# Patient Record
Sex: MALE | Race: OTHER | NOT HISPANIC OR LATINO | URBAN - METROPOLITAN AREA
[De-identification: names, ages, dates, MRNs, and addresses within clinical notes are randomized per-mention and may not be internally consistent; named-entity substitution may affect disease eponyms.]

---

## 2019-06-25 ENCOUNTER — EMERGENCY (EMERGENCY)
Facility: HOSPITAL | Age: 74
LOS: 1 days | Discharge: HOME | End: 2019-06-25
Attending: EMERGENCY MEDICINE | Admitting: EMERGENCY MEDICINE
Payer: MEDICAID

## 2019-06-25 VITALS
DIASTOLIC BLOOD PRESSURE: 84 MMHG | TEMPERATURE: 99 F | HEART RATE: 89 BPM | RESPIRATION RATE: 18 BRPM | SYSTOLIC BLOOD PRESSURE: 129 MMHG | OXYGEN SATURATION: 96 %

## 2019-06-25 LAB
ALBUMIN SERPL ELPH-MCNC: 3.9 G/DL — SIGNIFICANT CHANGE UP (ref 3.3–5)
ALP SERPL-CCNC: 60 U/L — SIGNIFICANT CHANGE UP (ref 40–120)
ALT FLD-CCNC: 16 U/L — SIGNIFICANT CHANGE UP (ref 4–41)
ANION GAP SERPL CALC-SCNC: 10 MMO/L — SIGNIFICANT CHANGE UP (ref 7–14)
APPEARANCE UR: CLEAR — SIGNIFICANT CHANGE UP
AST SERPL-CCNC: 15 U/L — SIGNIFICANT CHANGE UP (ref 4–40)
BACTERIA # UR AUTO: HIGH
BASOPHILS # BLD AUTO: 0.03 K/UL — SIGNIFICANT CHANGE UP (ref 0–0.2)
BASOPHILS NFR BLD AUTO: 0.3 % — SIGNIFICANT CHANGE UP (ref 0–2)
BILIRUB SERPL-MCNC: 0.4 MG/DL — SIGNIFICANT CHANGE UP (ref 0.2–1.2)
BILIRUB UR-MCNC: NEGATIVE — SIGNIFICANT CHANGE UP
BLOOD UR QL VISUAL: SIGNIFICANT CHANGE UP
BUN SERPL-MCNC: 14 MG/DL — SIGNIFICANT CHANGE UP (ref 7–23)
CALCIUM SERPL-MCNC: 9.8 MG/DL — SIGNIFICANT CHANGE UP (ref 8.4–10.5)
CHLORIDE SERPL-SCNC: 104 MMOL/L — SIGNIFICANT CHANGE UP (ref 98–107)
CO2 SERPL-SCNC: 28 MMOL/L — SIGNIFICANT CHANGE UP (ref 22–31)
COLOR SPEC: YELLOW — SIGNIFICANT CHANGE UP
CREAT SERPL-MCNC: 1.24 MG/DL — SIGNIFICANT CHANGE UP (ref 0.5–1.3)
EOSINOPHIL # BLD AUTO: 0.15 K/UL — SIGNIFICANT CHANGE UP (ref 0–0.5)
EOSINOPHIL NFR BLD AUTO: 1.7 % — SIGNIFICANT CHANGE UP (ref 0–6)
GLUCOSE SERPL-MCNC: 84 MG/DL — SIGNIFICANT CHANGE UP (ref 70–99)
GLUCOSE UR-MCNC: NEGATIVE — SIGNIFICANT CHANGE UP
HBA1C BLD-MCNC: 6.1 % — HIGH (ref 4–5.6)
HCT VFR BLD CALC: 42.6 % — SIGNIFICANT CHANGE UP (ref 39–50)
HGB BLD-MCNC: 13.2 G/DL — SIGNIFICANT CHANGE UP (ref 13–17)
HYALINE CASTS # UR AUTO: SIGNIFICANT CHANGE UP
IMM GRANULOCYTES NFR BLD AUTO: 1.4 % — SIGNIFICANT CHANGE UP (ref 0–1.5)
KETONES UR-MCNC: NEGATIVE — SIGNIFICANT CHANGE UP
LEUKOCYTE ESTERASE UR-ACNC: SIGNIFICANT CHANGE UP
LYMPHOCYTES # BLD AUTO: 2.85 K/UL — SIGNIFICANT CHANGE UP (ref 1–3.3)
LYMPHOCYTES # BLD AUTO: 33.1 % — SIGNIFICANT CHANGE UP (ref 13–44)
MCHC RBC-ENTMCNC: 26.7 PG — LOW (ref 27–34)
MCHC RBC-ENTMCNC: 31 % — LOW (ref 32–36)
MCV RBC AUTO: 86.2 FL — SIGNIFICANT CHANGE UP (ref 80–100)
MONOCYTES # BLD AUTO: 0.68 K/UL — SIGNIFICANT CHANGE UP (ref 0–0.9)
MONOCYTES NFR BLD AUTO: 7.9 % — SIGNIFICANT CHANGE UP (ref 2–14)
NEUTROPHILS # BLD AUTO: 4.79 K/UL — SIGNIFICANT CHANGE UP (ref 1.8–7.4)
NEUTROPHILS NFR BLD AUTO: 55.6 % — SIGNIFICANT CHANGE UP (ref 43–77)
NITRITE UR-MCNC: POSITIVE — HIGH
NRBC # FLD: 0 K/UL — SIGNIFICANT CHANGE UP (ref 0–0)
PH UR: 6 — SIGNIFICANT CHANGE UP (ref 5–8)
PLATELET # BLD AUTO: 282 K/UL — SIGNIFICANT CHANGE UP (ref 150–400)
PMV BLD: 10.1 FL — SIGNIFICANT CHANGE UP (ref 7–13)
POTASSIUM SERPL-MCNC: 4.3 MMOL/L — SIGNIFICANT CHANGE UP (ref 3.5–5.3)
POTASSIUM SERPL-SCNC: 4.3 MMOL/L — SIGNIFICANT CHANGE UP (ref 3.5–5.3)
PROT SERPL-MCNC: 6.8 G/DL — SIGNIFICANT CHANGE UP (ref 6–8.3)
PROT UR-MCNC: 30 — SIGNIFICANT CHANGE UP
RBC # BLD: 4.94 M/UL — SIGNIFICANT CHANGE UP (ref 4.2–5.8)
RBC # FLD: 15.1 % — HIGH (ref 10.3–14.5)
RBC CASTS # UR COMP ASSIST: SIGNIFICANT CHANGE UP (ref 0–?)
SODIUM SERPL-SCNC: 142 MMOL/L — SIGNIFICANT CHANGE UP (ref 135–145)
SP GR SPEC: 1.02 — SIGNIFICANT CHANGE UP (ref 1–1.04)
SQUAMOUS # UR AUTO: SIGNIFICANT CHANGE UP
UROBILINOGEN FLD QL: NORMAL — SIGNIFICANT CHANGE UP
WBC # BLD: 8.62 K/UL — SIGNIFICANT CHANGE UP (ref 3.8–10.5)
WBC # FLD AUTO: 8.62 K/UL — SIGNIFICANT CHANGE UP (ref 3.8–10.5)
WBC UR QL: >50 — HIGH (ref 0–?)

## 2019-06-25 PROCEDURE — 72131 CT LUMBAR SPINE W/O DYE: CPT | Mod: 26

## 2019-06-25 PROCEDURE — 70450 CT HEAD/BRAIN W/O DYE: CPT | Mod: 26

## 2019-06-25 PROCEDURE — 99220: CPT

## 2019-06-25 PROCEDURE — 93971 EXTREMITY STUDY: CPT | Mod: 26,LT

## 2019-06-25 RX ORDER — CEFTRIAXONE 500 MG/1
1000 INJECTION, POWDER, FOR SOLUTION INTRAMUSCULAR; INTRAVENOUS ONCE
Refills: 0 | Status: COMPLETED | OUTPATIENT
Start: 2019-06-25 | End: 2019-06-25

## 2019-06-25 RX ORDER — LIDOCAINE 4 G/100G
2 CREAM TOPICAL EVERY 24 HOURS
Refills: 0 | Status: DISCONTINUED | OUTPATIENT
Start: 2019-06-25 | End: 2019-06-29

## 2019-06-25 RX ORDER — OXYCODONE AND ACETAMINOPHEN 5; 325 MG/1; MG/1
1 TABLET ORAL ONCE
Refills: 0 | Status: DISCONTINUED | OUTPATIENT
Start: 2019-06-25 | End: 2019-06-25

## 2019-06-25 RX ORDER — OXYCODONE AND ACETAMINOPHEN 5; 325 MG/1; MG/1
1 TABLET ORAL EVERY 6 HOURS
Refills: 0 | Status: DISCONTINUED | OUTPATIENT
Start: 2019-06-25 | End: 2019-06-25

## 2019-06-25 RX ORDER — LIDOCAINE 4 G/100G
1 CREAM TOPICAL ONCE
Refills: 0 | Status: COMPLETED | OUTPATIENT
Start: 2019-06-25 | End: 2019-06-25

## 2019-06-25 RX ORDER — CEFTRIAXONE 500 MG/1
1000 INJECTION, POWDER, FOR SOLUTION INTRAMUSCULAR; INTRAVENOUS EVERY 24 HOURS
Refills: 0 | Status: DISCONTINUED | OUTPATIENT
Start: 2019-06-26 | End: 2019-06-29

## 2019-06-25 RX ORDER — KETOROLAC TROMETHAMINE 30 MG/ML
15 SYRINGE (ML) INJECTION EVERY 6 HOURS
Refills: 0 | Status: DISCONTINUED | OUTPATIENT
Start: 2019-06-25 | End: 2019-06-25

## 2019-06-25 RX ADMIN — CEFTRIAXONE 100 MILLIGRAM(S): 500 INJECTION, POWDER, FOR SOLUTION INTRAMUSCULAR; INTRAVENOUS at 19:51

## 2019-06-25 RX ADMIN — LIDOCAINE 1 PATCH: 4 CREAM TOPICAL at 17:36

## 2019-06-25 RX ADMIN — OXYCODONE AND ACETAMINOPHEN 1 TABLET(S): 5; 325 TABLET ORAL at 17:36

## 2019-06-25 NOTE — ED PROVIDER NOTE - ATTENDING CONTRIBUTION TO CARE
Dr. Batres:  I performed a face to face bedside interview with patient regarding history of present illness, review of symptoms and past medical history. I completed an independent physical exam.  I have discussed patient's plan of care with PA.   I agree with note as stated above, having amended the EMR as needed to reflect my findings.   This includes HISTORY OF PRESENT ILLNESS, HIV, PAST MEDICAL/SURGICAL/FAMILY/SOCIAL HISTORY, ALLERGIES AND HOME MEDICATIONS, REVIEW OF SYSTEMS, PHYSICAL EXAM, and any PROGRESS NOTES during the time I functioned as the attending physician for this patient.    74M denies known pmh presents with low back pain radiating to left lower extremity.  Endorses new incontinence x 3 days, both bladder and bowel.  Also endorses LLE numbness and ankle swelling.  Denies trauma/fall, fever/chills, cp, sob, n/v/d.    Exam:  - nad  - rrr  - ctab   -abd soft ntnd  - no midline TTP    A/P  - concern for spinal cord compression  - cbc, cmp, ua, urine culture  - MRI lumbar spine  - pain control

## 2019-06-25 NOTE — ED PROVIDER NOTE - OBJECTIVE STATEMENT
74 year old male with no known PMH (has not seen a doctor in years) presents to the ED complaining of low back pain for 3 days, severity 9/10, radiating to the left lower extremity, worse with walking, no relieving factors. Pt reports associated left lower extremity numbness with bowel/urinary incontinence to the point where he started wearing diapers for the first time since Sunday because he "wet the bed" multiple times. Pt denies saddle paresthesia, weakness, hx of malignancy, fall or trauma. Pt denies any other complaints.

## 2019-06-25 NOTE — ED CDU PROVIDER INITIAL DAY NOTE - MEDICAL DECISION MAKING DETAILS
1. Back pain:  Analgesia per orders, supportive care, MRI lumbar spine, general observation care / monitoring.   2. UTI: Antibiotics per orders, supportive care, UCX sent to lab / currently testing (f/u per ED Admin process), general observation care / monitoring.

## 2019-06-25 NOTE — ED CDU PROVIDER INITIAL DAY NOTE - OBJECTIVE STATEMENT
74 year old male with no known PMH (has not seen a doctor in years) presents to the ED complaining of low back pain for 3 days, severity 9/10, radiating to the left lower extremity, worse with walking, no relieving factors. Pt reports associated left lower extremity numbness with bowel/urinary incontinence to the point where he started wearing diapers for the first time since Sunday because he "wet the bed" multiple times. Pt denies saddle paresthesia, weakness, hx of malignancy, fall or trauma. Pt denies any other complaints.    CDU MARY Sauceda Note-----  73 yo male, no known PMH, no hx/o regular outpatient medical follow up; pt presented to the ED for back pain, LLE numbness, and incontinence as per above.  Patient was evaluated in the ED; CT head negative for acute pathology; CT lumbar spine showed: "There is no evidence for acute fracture or subluxation in the lumbar spine.  Disc bulges, facet degenerative changes, and ligamentum flavum hypertrophy result in multilevel spinal canal stenosis and neural foraminal narrowing, the overall degree of which is not well demonstrated on this study. The spinal canal stenosis does however appear at least moderate at the L3-L4 level.  Nonspecific small rounded lucencies involve the left iliac bone. Hemangiomas or other benign lesions, underlying metastatic disease, myeloma, or other lesion can't be excluded.".  US LLE negative for DVT; UA showed large leukocytes and positive nitrite; Urine Culture sent to lab and currently is testing.  Labs otherwise unremarkable.  Patient dispo'd to CDU for continued care plan:    1. Back pain:  Analgesia per orders, supportive care, MRI lumbar spine, general observation care / monitoring.   2. UTI: Antibiotics per orders, supportive care, UCX sent to lab / currently testing (f/u per ED Admin process), general observation care / monitoring.

## 2019-06-25 NOTE — ED PROVIDER NOTE - CLINICAL SUMMARY MEDICAL DECISION MAKING FREE TEXT BOX
74 year old male with no known PMH presenting with lumbar radiculopathy and associated urinary/bowel incontinence, concerning for compression cord syndrome. will do Lumbar spine CT, labs and UA, pain control, likely admit for a lumbar MRI.

## 2019-06-25 NOTE — ED CDU PROVIDER INITIAL DAY NOTE - ATTENDING CONTRIBUTION TO CARE
Dr. Batres:  I performed a face to face bedside interview with patient regarding history of present illness, review of symptoms and past medical history. I completed an independent physical exam.  I have discussed patient's plan of care with PA.   I agree with note as stated above, having amended the EMR as needed to reflect my findings.   This includes HISTORY OF PRESENT ILLNESS, HIV, PAST MEDICAL/SURGICAL/FAMILY/SOCIAL HISTORY, ALLERGIES AND HOME MEDICATIONS, REVIEW OF SYSTEMS, PHYSICAL EXAM, and any PROGRESS NOTES during the time I functioned as the attending physician for this patient.    74M denies known pmh presented to ED with low back pain radiating to left lower extremity.  Endorses new incontinence x 3 days, both bladder and bowel.  Also endorses LLE numbness and ankle swelling.  Denies trauma/fall, fever/chills, cp, sob, n/v/d.    Exam:  - nad  - rrr  - ctab   -abd soft ntnd  - no midline TTP    A/P  - concern for spinal cord compression  - MRI lumbar spine  - pain control.

## 2019-06-26 VITALS
SYSTOLIC BLOOD PRESSURE: 140 MMHG | RESPIRATION RATE: 18 BRPM | TEMPERATURE: 98 F | HEART RATE: 93 BPM | DIASTOLIC BLOOD PRESSURE: 97 MMHG | OXYGEN SATURATION: 99 %

## 2019-06-26 PROCEDURE — 72158 MRI LUMBAR SPINE W/O & W/DYE: CPT | Mod: 26

## 2019-06-26 PROCEDURE — 72170 X-RAY EXAM OF PELVIS: CPT | Mod: 26

## 2019-06-26 PROCEDURE — 99217: CPT

## 2019-06-26 RX ORDER — CEPHALEXIN 500 MG
1 CAPSULE ORAL
Qty: 14 | Refills: 0
Start: 2019-06-26 | End: 2019-07-02

## 2019-06-26 RX ADMIN — LIDOCAINE 2 PATCH: 4 CREAM TOPICAL at 11:56

## 2019-06-26 RX ADMIN — LIDOCAINE 1 PATCH: 4 CREAM TOPICAL at 00:35

## 2019-06-26 NOTE — PROVIDER CONTACT NOTE (OTHER) - ACTION/TREATMENT ORDERED:
Prescription and list of stores which provide RW given to Pt's son, he will pick him up home at 5 pm. PA and RN was informed. Prescription and list of stores which provide RW given to Pt's son, he provides home transportation at 5 pm today. PA and RN was informed.

## 2019-06-26 NOTE — ED CDU PROVIDER SUBSEQUENT DAY NOTE - MUSCULOSKELETAL, MLM
Spine appears normal, range of motion is not limited, no muscle or joint tenderness. 5/5 strength b/l. No saddle anesthesia.

## 2019-06-26 NOTE — ED CDU PROVIDER DISPOSITION NOTE - NSFOLLOWUPINSTRUCTIONS_ED_ALL_ED_FT
Follow up with Orthopedics Dr. Long in 1 week [phone]  Bear weight as tolerated  Use walker for assistance    Follow up with your primary care provider within 48 hours  Take copy of results with you  Take Keflex 500mg twice a day for 1 week for urinary tract infection    Follow up with Spine Ortho for lower back pain. Referral list given    Return to ER for any new or worsening symptoms.

## 2019-06-26 NOTE — PROVIDER CONTACT NOTE (OTHER) - ASSESSMENT
Pt is a 74-year-old male, who found with UTI and possible lytic lesions vs hemangiomas on MRI lumbar spine. Pt was referred to ED CM for potential PT services (out-pt vs. STR). [place] is [place] and has no insurance coverage, spoke to Mr. Tam (medicaid supervisor) at [phone], Pt is eligible for emergency medicaid and has coverage for inpatient expenses, however for any out-pt services Pt has to pay from his packet. Pt is waiting for PT evaluation, will follow the d/c plan. Pt is a 74-year-old male, who found with UTI and possible lytic lesions vs hemangiomas on MRI lumbar spine. Pt was referred to ED CM for potential PT services (out-pt vs. STR). [place] and has no insurance coverage, spoke to Mr. Tam (medicaid supervisor) at [phone], Pt is eligible for emergency medicaid and has coverage for inpatient expenses, however for any out-pt services Pt has to pay from his packet.

## 2019-06-26 NOTE — ED CDU PROVIDER SUBSEQUENT DAY NOTE - MEDICAL DECISION MAKING DETAILS
73 y/o male with no pmhx (no pcp, does not follow with doctors) presents to ED c/o few days of lower back pain radiating down left leg with stiffness and difficulty ambulating with cane. Pt also noted new urinary and bowel incontinence for past few days. CT head negative. Pt found with UTI and possible lytic lesions vs hemangiomas on MRI lumbar spine. No spinal cord compression. discussed with neurosurgery and no surgical intervention, suggesting ortho consult. ortho paged will see pt. pending PT eval.

## 2019-06-26 NOTE — PROVIDER CONTACT NOTE (OTHER) - RECOMMENDATIONS
PT evaluated the patient and Rolling Walker was recommended. Spoke to Pt's son (Alden) at [phone] regarding d/c plan, he is in agreement and he will pay for his RW.

## 2019-06-26 NOTE — ED CDU PROVIDER SUBSEQUENT DAY NOTE - HISTORY
73 y/o male with no pmhx (no pcp, does not follow with doctors) presents to ED c/o few days of lower back pain radiating down left leg with stiffness and difficulty ambulating. Pt also noted new urinary and bowel incontinence for past few days and started wearing diapers. Pt states his pain is well controlled however still having difficulty walking with his cane. Pt found with UTI and possible lytic lesions vs hemangiomas on MRI lumbar spine. No spinal cord compression. No fever, chills, cp, sob, abd pain, flank pain, n/v, weakness, numbness, hematuria, headache, dizziness, saddle anesthesia.

## 2019-06-26 NOTE — ED CDU PROVIDER SUBSEQUENT DAY NOTE - PROGRESS NOTE DETAILS
as per ortho can f.u outpt . pt amenable for dc. given prescription for walker as per PT and case management. son will be picking pt up. discussed need for close outpt follow up. pt self pay and prefers to be worked up outpt

## 2019-06-26 NOTE — ED CDU PROVIDER SUBSEQUENT DAY NOTE - ATTENDING CONTRIBUTION TO CARE
Patient presents with lower back pain rad down left leg and urinary frequency/incont, bowel incont, had w/u in ed with ct and sent to cdu for mri. Patient states pain is improved though still having difficulty walking. Denies numbness/focal weakness, no saddle anesthesia. MR results reviewed with patient. on abx for uti.   exam  A & O x 3, NAD, HEENT WNL and no facial asymmetry; lungs CTAB, heart with reg rhythm without murmur; abdomen soft NTND; back with no spinal tenderness or stepoff, extremities with no edema and from; neuro exam 5/5 strength in all extremities, sensation intact, gait steady. On ambulation trial with cane, patient feels he cant take more then 1 step which is unusual for him. Plan in cdu to discuss findings with spine/ortho, pt eval, abx for uti, monitor, reass.

## 2019-06-26 NOTE — CONSULT NOTE ADULT - SUBJECTIVE AND OBJECTIVE BOX
74y Male presents with low back pain radiating down the entire left leg with reported weakness since Sunday. Patient also endorses bladder/bowel incontinence since Sunday. Neurosurgery recommended MRI L spine which was negative for compression so recommended no acute intervention to the ED. CT L spine obtained by ED revealed several lesions in L iliac wing. Ortho consulted for these lesions. Patient denies recent trauma, fever/chills, genital/perianal numbness, or any medical history. Of note, patient is visiting his son for several weeks and lives in [place].         PAST MEDICAL & SURGICAL HISTORY:  No pertinent past medical history  No significant past surgical history    MEDICATIONS  (STANDING):  cefTRIAXone   IVPB 1000 milliGRAM(s) IV Intermittent every 24 hours  lidocaine   Patch 2 Patch Transdermal every 24 hours    MEDICATIONS  (PRN):  ketorolac   Injectable 15 milliGRAM(s) IV Push every 6 hours PRN mild to moderate pain  oxyCODONE    5 mG/acetaminophen 325 mG 1 Tablet(s) Oral every 6 hours PRN Severe Pain (7 - 10)    No Known Allergies      Physical Exam  T(C): 36.6 (06-26-19 @ 10:06), Max: 37 (06-25-19 @ 16:15)  HR: 64 (06-26-19 @ 10:06) (64 - 95)  BP: 144/97 (06-26-19 @ 10:06) (126/88 - 144/97)  RR: 18 (06-26-19 @ 10:06) (16 - 18)  SpO2: 97% (06-26-19 @ 10:06) (96% - 98%)  Wt(kg): --    PE  Gen: NAD, alert and oriented  Resp: Unlabored breathing  B/L LEs  Skin intact  L2-S1 5/5  L2-S1 SILT  Dp+  negative SLR  negative clonus  negative babinski  2+ reflexes  negative log roll  negative heel strike    some pain with palpation of Left iliac crest              Imaging  X-ray: no visible lesion  CT pelvis: several left iliac wing lesions    A/P: 74yoM w/ no PMHx presents with LBP radiating down left leg and new lesions in left iliac wing found on CT pelvis    - No acute orthopedic intervention  - Spine recs per neurosurgery  - pain control  - WBAT  - FU with Dr. Long in 1 week. [phone]   [id number]

## 2019-06-26 NOTE — ED CDU PROVIDER DISPOSITION NOTE - ATTENDING CONTRIBUTION TO CARE
73 y/o male with no pmhx (no pcp, does not follow with doctors) presents to ED c/o few days of lower back pain radiating down left leg with stiffness and difficulty ambulating. Pt also noted new urinary and bowel incontinence for past few days and started wearing diapers. Pt states his pain is well controlled however still having difficulty walking with his cane. Patient had labs, ua, ct and mri l-spine. Pt found with UTI and possible lytic lesions vs hemangiomas on MRI on iliac. No spinal cord compression. No fever, chills, cp, sob, abd pain, flank pain, n/v, weakness, numbness, hematuria, headache, dizziness, saddle anesthesia. Patient was evaluated by orthopedics-determined no acute intervention needed. Evaluated by pt who recommended ok for dc with walker. Patient trialed walker in cdu with success. All results d/w him and copies given. Given abx for uti. Return precautions discussed. Instructed on f/u with orthopedics/spine and pcp asap. DC home with his son.

## 2019-06-27 LAB — SPECIMEN SOURCE: SIGNIFICANT CHANGE UP

## 2019-06-29 LAB
-  AMIKACIN: SIGNIFICANT CHANGE UP
-  AMPICILLIN/SULBACTAM: SIGNIFICANT CHANGE UP
-  AMPICILLIN: SIGNIFICANT CHANGE UP
-  AZTREONAM: SIGNIFICANT CHANGE UP
-  CEFAZOLIN: SIGNIFICANT CHANGE UP
-  CEFEPIME: SIGNIFICANT CHANGE UP
-  CEFOXITIN: SIGNIFICANT CHANGE UP
-  CEFTAZIDIME: SIGNIFICANT CHANGE UP
-  CEFTRIAXONE: SIGNIFICANT CHANGE UP
-  CEFUROXIME: SIGNIFICANT CHANGE UP
-  CIPROFLOXACIN: SIGNIFICANT CHANGE UP
-  ERTAPENEM: SIGNIFICANT CHANGE UP
-  GENTAMICIN: SIGNIFICANT CHANGE UP
-  IMIPENEM: SIGNIFICANT CHANGE UP
-  LEVOFLOXACIN: SIGNIFICANT CHANGE UP
-  MEROPENEM: SIGNIFICANT CHANGE UP
-  NITROFURANTOIN: SIGNIFICANT CHANGE UP
-  PIPERACILLIN/TAZOBACTAM: SIGNIFICANT CHANGE UP
-  TIGECYCLINE: SIGNIFICANT CHANGE UP
-  TOBRAMYCIN: SIGNIFICANT CHANGE UP
-  TRIMETHOPRIM/SULFAMETHOXAZOLE: SIGNIFICANT CHANGE UP
BACTERIA UR CULT: SIGNIFICANT CHANGE UP
METHOD TYPE: SIGNIFICANT CHANGE UP
ORGANISM # SPEC MICROSCOPIC CNT: SIGNIFICANT CHANGE UP
ORGANISM # SPEC MICROSCOPIC CNT: SIGNIFICANT CHANGE UP

## 2019-06-29 RX ORDER — NITROFURANTOIN MACROCRYSTAL 50 MG
1 CAPSULE ORAL
Qty: 14 | Refills: 0
Start: 2019-06-29 | End: 2019-07-05

## 2019-06-29 NOTE — ED POST DISCHARGE NOTE - RESULT SUMMARY
Patients UCx +>100,000 CFU enterobacter cloacace as Pre-keys result with sensitivities pending, pt had +UA and DC on keflex. Admin PA to follow up sensitivity/Final Result.
